# Patient Record
Sex: FEMALE | Race: WHITE | NOT HISPANIC OR LATINO
[De-identification: names, ages, dates, MRNs, and addresses within clinical notes are randomized per-mention and may not be internally consistent; named-entity substitution may affect disease eponyms.]

---
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
## 2025-05-08 NOTE — ED AVS SNAPSHOT
Preauthorization order on the treatment plan was signed per JUAN LUIS Prater.   THE [facility] in [place] [address] [address]    Phone:  [phone]    Fax:  [phone]           Robin Tee   MRN: [id number]    Department:  THE [facility] in [place]   Date of Visit:  6/23/2017           Diagno Purchase a non-drowsy antihistamine (claritin, allergra or zyrtec) and take along with pepcid and ibuprofen. You may also try 1% hydrocortisone to the itchiest areas.      Discharge References/Attachments     HIVES (ADULT) (ENGLISH)      Disclosure     Ins Immediate Cares. Follow-up care is at the discretion of that Physician. IF THERE IS ANY CHANGE OR WORSENING OF YOUR CONDITION, CALL YOUR PRIMARY CARE PHYSICIAN AT ONCE OR GO TO THE EMERGENCY DEPARTMENT.     If you have been prescribed any medication(s), - If you don’t have insurance, [facility] has partnered with [facility] to help you get signed up for insurance coverage.   [facility] [employer] is a [employer] program that can help with your Affordable Care Act cover

## (undated) NOTE — LETTER
[facility] CARE IN [place]  [phone] [id number]  Dept: [phone]  Dept Fax: [phone]      July 29, 2017    Patient: Anupam Dolores   Date of Visit: 7/29/2017       To Whom It May Concern:    Anupam Bernal was seen and treated in

## (undated) NOTE — LETTER
[facility] CARE IN [place]  [address] [id number]  Dept: [phone]  Dept Fax: [phone]      July 15, 2017    Patient: Cordell Garcia   Date of Visit: 7/15/2017       To Whom It May Concern:    Cordell Garcia was seen and treated in

## (undated) NOTE — LETTER
[facility] CARE IN [place]  [address] [id number]  Dept: [phone]  Dept Fax: [phone]      July 29, 2017    Patient: Yang Roa   Date of Visit: 7/29/2017       To Whom It May Concern:    Yang Roa was seen and treated in